# Patient Record
Sex: FEMALE | Race: ASIAN | Employment: FULL TIME | URBAN - METROPOLITAN AREA
[De-identification: names, ages, dates, MRNs, and addresses within clinical notes are randomized per-mention and may not be internally consistent; named-entity substitution may affect disease eponyms.]

---

## 2018-01-24 LAB
HBV SURFACE AG SERPL QL IA: NEGATIVE
HIV 1+2 AB+HIV1 P24 AG SERPL QL IA: NORMAL
RUBELLA ANTIBODY IGG QUANTITATIVE: NORMAL IU/ML
T PALLIDUM IGG SER QL: NORMAL

## 2018-03-19 ENCOUNTER — PRE VISIT (OUTPATIENT)
Dept: MATERNAL FETAL MEDICINE | Facility: CLINIC | Age: 38
End: 2018-03-19

## 2018-03-26 ENCOUNTER — OFFICE VISIT (OUTPATIENT)
Dept: MATERNAL FETAL MEDICINE | Facility: CLINIC | Age: 38
End: 2018-03-26
Attending: OBSTETRICS & GYNECOLOGY
Payer: COMMERCIAL

## 2018-03-26 ENCOUNTER — HOSPITAL ENCOUNTER (OUTPATIENT)
Dept: ULTRASOUND IMAGING | Facility: CLINIC | Age: 38
Discharge: HOME | End: 2018-03-26
Attending: OBSTETRICS & GYNECOLOGY | Admitting: OBSTETRICS & GYNECOLOGY
Payer: COMMERCIAL

## 2018-03-26 DIAGNOSIS — O09.522 ELDERLY MULTIGRAVIDA IN SECOND TRIMESTER: Primary | ICD-10-CM

## 2018-03-26 DIAGNOSIS — O26.90 PREGNANCY RELATED CONDITION, ANTEPARTUM: ICD-10-CM

## 2018-03-26 DIAGNOSIS — O09.522 SUPERVISION OF ELDERLY MULTIGRAVIDA IN SECOND TRIMESTER: Primary | ICD-10-CM

## 2018-03-26 DIAGNOSIS — O26.879 SHORT CERVIX AFFECTING PREGNANCY: ICD-10-CM

## 2018-03-26 DIAGNOSIS — O34.219 HISTORY OF CESAREAN DELIVERY AFFECTING PREGNANCY: ICD-10-CM

## 2018-03-26 DIAGNOSIS — O44.02 PLACENTA PREVIA ANTEPARTUM IN SECOND TRIMESTER: ICD-10-CM

## 2018-03-26 PROCEDURE — 96040 ZZH GENETIC COUNSELING, EACH 30 MINUTES: CPT | Mod: ZF | Performed by: GENETIC COUNSELOR, MS

## 2018-03-26 PROCEDURE — 76811 OB US DETAILED SNGL FETUS: CPT

## 2018-03-26 PROCEDURE — 76817 TRANSVAGINAL US OBSTETRIC: CPT

## 2018-03-26 NOTE — MR AVS SNAPSHOT
After Visit Summary   3/26/2018    Erin Oro    MRN: [id number]           Patient Information     Date Of Birth          1980        Visit Information        Provider Department      3/26/2018 11:30 AM Hallie Lazcano MD [facility] Maternal Fetal Medicine [facility]        Today's Diagnoses     Elderly multigravida in second trimester    -  1    History of  delivery affecting pregnancy        Short cervix affecting pregnancy        Placenta previa antepartum in second trimester           Follow-ups after your visit        Your next 10 appointments already scheduled     2018  3:00 PM CDT   (Arrive by 2:45 PM)   MFM US OB TRANSVAGINAL with SHMFMUSR3   [facility] Maternal Fetal Medicine Ultrasound - [facility] ([facility])    [address] [place] [address]  [facility] [id number] [phone]           Wear comfortable clothes and leave your valuables at home.            2018  3:30 PM CDT   [facility]   [facility] Maternal Fetal Medicine - [facility] ([facility])    [address] [place] [address]  [facility] [id number] [phone]           Please arrive at the time given for your first appointment. This visit is used internally to schedule the physician's time during your ultrasound.              Future tests that were ordered for you today     Open Future Orders        Priority Expected Expires Ordered    MFM US OB Transvaginal Routine 2018 2019 3/26/2018            Who to contact     If you have questions or need follow up information about today's clinic visit or your schedule please contact [facility] MATERNAL FETAL MEDICINE [facility] directly at [phone].  Normal or non-critical lab and imaging results will be communicated to you by MyChart, letter or phone within 4 business days after the clinic has received the results. If you do not hear from us within 7 days, please contact the clinic through  "MobSmithhart or phone. If you have a critical or abnormal lab result, we will notify you by phone as soon as possible.  Submit refill requests through Quipper or call your pharmacy and they will forward the refill request to us. Please allow 3 business days for your refill to be completed.          Additional Information About Your Visit        MobSmithhart Information     Quipper lets you send messages to your doctor, view your test results, renew your prescriptions, schedule appointments and more. To sign up, go to [email or web address] . Click on \"Log in\" on the left side of the screen, which will take you to the Welcome page. Then click on \"Sign up Now\" on the right side of the page.     You will be asked to enter the access code listed below, as well as some personal information. Please follow the directions to create your username and password.     Your access code is: [email or web address]  Expires: 2018 12:10 PM     Your access code will  in 90 days. If you need help or a new code, please call your [place] clinic or [phone].        Care EveryWhere ID     This is your Care EveryWhere ID. This could be used by other organizations to access your [place] medical records  [id number]        Your Vitals Were     Last Period                   2017            Blood Pressure from Last 3 Encounters:   No data found for BP    Weight from Last 3 Encounters:   No data found for Wt               Primary Care Provider    None Specified       No primary provider on file.        Equal Access to Services     [facility] : Hadii timur mcnultyo Sobenjamin, waaxda luqadaha, qaybta kaalmada renukada, jose james . So [facility] [phone].    ATENCIÓN: Si habla español, tiene a vaughn disposición servicios gratuitos de asistencia lingüística. Llame al [phone].    We comply with applicable federal civil rights laws and [place] laws. We do not discriminate on the basis of race, color, national " origin, age, disability, sex, sexual orientation, or gender identity.            Thank you!     Thank you for choosing [facility]  for your care. Our goal is always to provide you with excellent care. Hearing back from our patients is one way we can continue to improve our services. Please take a few minutes to complete the written survey that you may receive in the mail after your visit with us. Thank you!             Your Updated Medication List - Protect others around you: Learn how to safely use, store and throw away your medicines at www.disposemymeds.org.      Notice  As of 3/26/2018 12:10 PM    You have not been prescribed any medications.

## 2018-03-26 NOTE — PROGRESS NOTES
Please see ultrasound report under imaging tab for details on ultrasound performed today.    Hallie Lazcano MD  , OB/GYN  Maternal-Fetal Medicine  [email or web address]  [phone] (Academic office)  [phone] (Pager)

## 2018-03-26 NOTE — MR AVS SNAPSHOT
After Visit Summary   3/26/2018    Erin Oro    MRN: [id number]           Patient Information     Date Of Birth          1980        Visit Information        Provider Department      3/26/2018 10:15 AM Berna Castillo GC [facility] Maternal Fetal Medicine [facility]        Today's Diagnoses     Supervision of elderly multigravida in second trimester    -  1    Pregnancy related condition, antepartum           Follow-ups after your visit        Your next 10 appointments already scheduled     Apr 02, 2018  3:00 PM CDT   (Arrive by 2:45 PM)   MFM US OB TRANSVAGINAL with SHMFMUSR3   [facility] Maternal Fetal Medicine Ultrasound - [facility] ([facility])    [address] [place] [address]  [place] [id number] [phone]           Wear comfortable clothes and leave your valuables at home.            Apr 02, 2018  3:30 PM CDT   [facility]   [facility] Maternal Fetal Medicine [facility] ([facility])    [address] [place] [address]  [facility] [id number] [phone]           Please arrive at the time given for your first appointment. This visit is used internally to schedule the physician's time during your ultrasound.              Future tests that were ordered for you today     Open Future Orders        Priority Expected Expires Ordered    MFM US OB Transvaginal Routine 4/2/2018 1/26/2019 3/26/2018            Who to contact     If you have questions or need follow up information about today's clinic visit or your schedule please contact [facility] MATERNAL FETAL [facility] directly at [phone].  Normal or non-critical lab and imaging results will be communicated to you by MyChart, letter or phone within 4 business days after the clinic has received the results. If you do not hear from us within 7 days, please contact the clinic through MyChart or phone. If you have a critical or abnormal lab result, we will notify you by phone  "as soon as possible.  Submit refill requests through Holidog or call your pharmacy and they will forward the refill request to us. Please allow 3 business days for your refill to be completed.          Additional Information About Your Visit        Check I'm HereharChat Sports Information     Holidog lets you send messages to your doctor, view your test results, renew your prescriptions, schedule appointments and more. To sign up, go to [email or web address] . Click on \"Log in\" on the left side of the screen, which will take you to the Welcome page. Then click on \"Sign up Now\" on the right side of the page.     You will be asked to enter the access code listed below, as well as some personal information. Please follow the directions to create your username and password.     Your access code is: [email or web address]  Expires: 2018 12:10 PM     Your access code will  in 90 days. If you need help or a new code, please call your [place] clinic or [phone].        Care EveryWhere ID     This is your Care EveryWhere ID. This could be used by other organizations to access your [place] medical records  [id number]        Your Vitals Were     Last Period                   2017            Blood Pressure from Last 3 Encounters:   No data found for BP    Weight from Last 3 Encounters:   No data found for Wt              We Performed the Following     [facility]        Primary Care Provider    None Specified       No primary provider on file.        Equal Access to Services     BRITNEY GARCÍA : Hadii timur Mendoza, waaxda neliaadaha, qaybta kaaljose darnell . So [facility] [phone].    ATENCIÓN: Si habla español, tiene a vaughn disposición servicios gratuitos de asistencia lingüística. Jeanne al [phone].    We comply with applicable federal civil rights laws and [place] laws. We do not discriminate on the basis of race, color, national origin, age, disability, sex, " sexual orientation, or gender identity.            Thank you!     Thank you for choosing [facility]  for your care. Our goal is always to provide you with excellent care. Hearing back from our patients is one way we can continue to improve our services. Please take a few minutes to complete the written survey that you may receive in the mail after your visit with us. Thank you!             Your Updated Medication List - Protect others around you: Learn how to safely use, store and throw away your medicines at www.disposemymeds.org.      Notice  As of 3/26/2018  2:34 PM    You have not been prescribed any medications.

## 2018-03-26 NOTE — PROGRESS NOTES
[facility]  Genetic Counseling Consult    Patient: Erin Oro YOB: 1980   Date of Service: 3/26/18      Erin Oro was seen at [facility] for genetic consultation as part of her comprehensive ultrasound appointment to discuss the options for screening and testing for fetal chromosome abnormalities.  The indication for genetic counseling is advanced maternal age.        Impression/Plan:   1.  Erin had a level II ultrasound. A shorter than expected cervix was noted on ultrasound today. See ultrasound report.    2.  Erin had a normal InformaSeq screen through her OB provider, consistent with a male fetus.    3.  Erin has had a normal AFP screen (1.16 MoM).    Pregnancy History:   /Parity:    Age at Delivery: 37 year old  LESLI: 2018, by Last Menstrual Period  Gestational Age: 18w6d    No significant complications or exposures were reported in the current pregnancy. She had approximately 2 weeks of bleeding in the first trimester which has since resolved. Erin reports having ultrasounds in that time period which were reassuring.    Erin corea pregnancy history is significant for:  o 2013:  (breech), female  o : repeat , female    Medical History:   Erin corea reported medical history is not expected to impact pregnancy management or risks to fetal development.       Family History:   A three-generation pedigree was obtained, and is scanned under the  Media  tab.   The following significant findings were reported by Erin:    Erin's partner, Se Torres's mother is in good health. She reportedly had several brothers and sisters, all of whom passed away in early childhood. No further information is known regarding the causes of death for these individuals.    Otherwise, the reported family history is negative for multiple miscarriages, stillbirths, birth defects, mental retardation,  known genetic conditions, and consanguinity.       Risk Assessment for Chromosome Conditions:   We explained that the risk for fetal chromosome abnormalities increases with maternal age. We discussed specific features of common chromosome abnormalities, including Down syndrome, trisomy 13, trisomy 18, and sex chromosome trisomies. These risk estimates are no longer accurate given her normal NIPT result.      - At age 37 at midtrimester, the risk to have a baby with Down syndrome is 1 in 129.     - At age 37 at midtrimester, the risk to have a baby with any chromosome abnormality is 1 in 65.     Testing Options:   We discussed the following options:   Comprehensive (Level II) ultrasound: Detailed ultrasound performed between 18-22 weeks gestation to screen for major birth defects and markers for aneuploidy.    We reviewed the benefits and limitations of this testing.  Screening tests provide a risk assessment specific to the pregnancy for certain fetal chromosome abnormalities, but cannot definitively diagnose or exclude a fetal chromosome abnormality.  Follow-up genetic counseling and consideration of diagnostic testing is recommended with any abnormal screening result. Diagnostic tests carry inherent risks- including risk of miscarriage- that require careful consideration.  These tests can detect fetal chromosome abnormalities with greater than 99% certainty.  Results can be compromised by maternal cell contamination or mosaicism, and are limited by the resolution of cytogenetic G-banding technology.  There is no screening nor diagnostic test that can detect all forms of birth defects or mental disability.     It was a pleasure to be involved with Erin s care. Face-to-face time of the meeting was 20 minutes.    Jenny Castillo MS, [facility]  Maternal Fetal Medicine  [facility]  Ph: [phone]  [email or web address]

## 2018-04-02 ENCOUNTER — OFFICE VISIT (OUTPATIENT)
Dept: MATERNAL FETAL MEDICINE | Facility: CLINIC | Age: 38
End: 2018-04-02
Attending: OBSTETRICS & GYNECOLOGY
Payer: COMMERCIAL

## 2018-04-02 ENCOUNTER — HOSPITAL ENCOUNTER (OUTPATIENT)
Dept: ULTRASOUND IMAGING | Facility: CLINIC | Age: 38
Discharge: HOME | End: 2018-04-02
Attending: OBSTETRICS & GYNECOLOGY | Admitting: OBSTETRICS & GYNECOLOGY
Payer: COMMERCIAL

## 2018-04-02 DIAGNOSIS — O26.879 SHORT CERVIX AFFECTING PREGNANCY: Primary | ICD-10-CM

## 2018-04-02 DIAGNOSIS — O44.02 PLACENTA PREVIA ANTEPARTUM IN SECOND TRIMESTER: ICD-10-CM

## 2018-04-02 DIAGNOSIS — O26.879 SHORT CERVIX AFFECTING PREGNANCY: ICD-10-CM

## 2018-04-02 PROCEDURE — 76817 TRANSVAGINAL US OBSTETRIC: CPT

## 2018-04-02 NOTE — MR AVS SNAPSHOT
After Visit Summary   4/2/2018    Erin Oro    MRN: [id number]           Patient Information     Date Of Birth          1980        Visit Information        Provider Department      4/2/2018 3:30 PM Hallie Lazcano MD [facility] Maternal Fetal Medicine [facility]        Today's Diagnoses     Short cervix affecting pregnancy    -  1    Placenta previa antepartum in second trimester           Follow-ups after your visit        Your next 10 appointments already scheduled     Apr 16, 2018  3:00 PM CDT   (Arrive by 2:45 PM)   MFM US OB TRANSVAGINAL with SHMFMUSR3   [facility] Maternal Fetal Medicine Ultrasound - [facility] ([facility])    [address] [place] [address] 250  [place] [address] [phone]           Wear comfortable clothes and leave your valuables at home.            Apr 16, 2018  3:30 PM CDT   Radiology MD with  DELMA CONNOLLY   [facility] Maternal Fetal Medicine [facility] ([facility])    [address] [place] [address] 250  [place] [address] [phone]           Please arrive at the time given for your first appointment. This visit is used internally to schedule the physician's time during your ultrasound.              Future tests that were ordered for you today     Open Future Orders        Priority Expected Expires Ordered    MFM US OB Transvaginal Routine 4/16/2018 2/2/2019 4/2/2018            Who to contact     If you have questions or need follow up information about today's clinic visit or your schedule please contact [facility] MATERNAL FETAL MEDIC[facility] directly at [phone].  Normal or non-critical lab and imaging results will be communicated to you by MyChart, letter or phone within 4 business days after the clinic has received the results. If you do not hear from us within 7 days, please contact the clinic through MyChart or phone. If you have a critical or abnormal lab result, we will notify you by phone as soon as  "possible.  Submit refill requests through Journeys or call your pharmacy and they will forward the refill request to us. Please allow 3 business days for your refill to be completed.          Additional Information About Your Visit        RenmatixharCrowdcast Information     Journeys lets you send messages to your doctor, view your test results, renew your prescriptions, schedule appointments and more. To sign up, go to [email or web address] . Click on \"Log in\" on the left side of the screen, which will take you to the Welcome page. Then click on \"Sign up Now\" on the right side of the page.     You will be asked to enter the access code listed below, as well as some personal information. Please follow the directions to create your username and password.     Your access code is: [email or web address]  Expires: 2018 12:10 PM     Your access code will  in 90 days. If you need help or a new code, please call your [place] clinic or [phone].        Care EveryWhere ID     This is your Care EveryWhere ID. This could be used by other organizations to access your [place] medical records  [id number]        Your Vitals Were     Last Period                   2017            Blood Pressure from Last 3 Encounters:   No data found for BP    Weight from Last 3 Encounters:   No data found for Wt               Primary Care Provider    None Specified       No primary provider on file.        Equal Access to Services     [facility]: Hadii timur mcnultyo Sotirsoali, waaxda luqadaha, qaybta kaalmada veto, jose james . So [facility] [phone].    ATENCIÓN: Si habla español, tiene a vaughn disposición servicios gratuitos de asistencia lingüística. Llame al [phone].    We comply with applicable federal civil rights laws and [place] laws. We do not discriminate on the basis of race, color, national origin, age, disability, sex, sexual orientation, or gender identity.            Thank you!     Thank you " for choosing [facility]  for your care. Our goal is always to provide you with excellent care. Hearing back from our patients is one way we can continue to improve our services. Please take a few minutes to complete the written survey that you may receive in the mail after your visit with us. Thank you!             Your Updated Medication List - Protect others around you: Learn how to safely use, store and throw away your medicines at www.disposemymeds.org.      Notice  As of 4/2/2018  4:14 PM    You have not been prescribed any medications.

## 2018-04-02 NOTE — PROGRESS NOTES
Please see ultrasound report under imaging tab for details on ultrasound performed today.    Hallie Lazcano MD  , OB/GYN  Maternal-Fetal Medicine  [email or web address]  [phone] (Academic office)  [phone] (Pager)

## 2018-04-16 ENCOUNTER — HOSPITAL ENCOUNTER (OUTPATIENT)
Dept: ULTRASOUND IMAGING | Facility: CLINIC | Age: 38
Discharge: HOME | End: 2018-04-16
Attending: OBSTETRICS & GYNECOLOGY | Admitting: OBSTETRICS & GYNECOLOGY
Payer: COMMERCIAL

## 2018-04-16 ENCOUNTER — OFFICE VISIT (OUTPATIENT)
Dept: MATERNAL FETAL MEDICINE | Facility: CLINIC | Age: 38
End: 2018-04-16
Attending: OBSTETRICS & GYNECOLOGY
Payer: COMMERCIAL

## 2018-04-16 DIAGNOSIS — O26.879 SHORT CERVIX AFFECTING PREGNANCY: ICD-10-CM

## 2018-04-16 DIAGNOSIS — O26.879 SHORT CERVIX AFFECTING PREGNANCY: Primary | ICD-10-CM

## 2018-04-16 PROCEDURE — 76817 TRANSVAGINAL US OBSTETRIC: CPT

## 2018-04-16 NOTE — PROGRESS NOTES
"Please see \"Imaging\" tab under \"Chart Review\" for details of today's US.    Justine Butt, DO    "

## 2018-04-16 NOTE — MR AVS SNAPSHOT
After Visit Summary   4/16/2018    Erin Oro    MRN: [id number]           Patient Information     Date Of Birth          1980        Visit Information        Provider Department      4/16/2018 3:30 PM Justine Butt,  [facility]        Today's Diagnoses     Short cervix affecting pregnancy    -  1       Follow-ups after your visit        Your next 10 appointments already scheduled     Apr 24, 2018  3:00 PM CDT   (Arrive by 2:45 PM)   MFM US OB TRANSVAGINAL with MFMUSR3   [facility] Maternal Fetal Medicine Ultrasound - [facility])    [address]  [facility] [address] [phone]           Wear comfortable clothes and leave your valuables at home.            Apr 24, 2018  3:30 PM CDT   Radiology MD with  MFM MD   [facility] Maternal Fetal Medicine [facility] ([facility])    [address]  [facility] [address] [phone]           Please arrive at the time given for your first appointment. This visit is used internally to schedule the physician's time during your ultrasound.            Aug 14, 2018   Procedure with Yeny Corey MD    [facility])    [address]  [facility] [address] [phone]              Future tests that were ordered for you today     Open Future Orders        Priority Expected Expires Ordered    MFM US OB Transvaginal Routine 4/23/2018 2/16/2019 4/16/2018            Who to contact     If you have questions or need follow up information about today's clinic visit or your schedule please contact [facility] MATERNAL FETAL [facility] directly at [phone].  Normal or non-critical lab and imaging results will be communicated to you by MyChart, letter or phone within 4 business days after the clinic has received the results. If you do not hear from us within 7 days, please contact the clinic through MyChart or phone. If you  "have a critical or abnormal lab result, we will notify you by phone as soon as possible.  Submit refill requests through Retidoc or call your pharmacy and they will forward the refill request to us. Please allow 3 business days for your refill to be completed.          Additional Information About Your Visit        shopphart Information     Retidoc lets you send messages to your doctor, view your test results, renew your prescriptions, schedule appointments and more. To sign up, go to [email or web address] . Click on \"Log in\" on the left side of the screen, which will take you to the Welcome page. Then click on \"Sign up Now\" on the right side of the page.     You will be asked to enter the access code listed below, as well as some personal information. Please follow the directions to create your username and password.     Your access code is: [email or web address]  Expires: 2018 12:10 PM     Your access code will  in 90 days. If you need help or a new code, please call your [place] clinic or [phone].        Care EveryWhere ID     This is your Care EveryWhere ID. This could be used by other organizations to access your [place] medical records  [id number]        Your Vitals Were     Last Period                   2017            Blood Pressure from Last 3 Encounters:   No data found for BP    Weight from Last 3 Encounters:   No data found for Wt               Primary Care Provider    None Specified       No primary provider on file.        Equal Access to Services     [facility]: Hadii timur mcnultyo Sobenjamin, waaxda luqadaha, qaybta kaalmada jose laws . So [facility] [phone].    ATENCIÓN: Si habla español, tiene a vaughn disposición servicios gratuitos de asistencia lingüística. Llame al [phone].    We comply with applicable federal civil rights laws and [place] laws. We do not discriminate on the basis of race, color, national origin, age, disability, " sex, sexual orientation, or gender identity.            Thank you!     Thank you for choosing [facility]  for your care. Our goal is always to provide you with excellent care. Hearing back from our patients is one way we can continue to improve our services. Please take a few minutes to complete the written survey that you may receive in the mail after your visit with us. Thank you!             Your Updated Medication List - Protect others around you: Learn how to safely use, store and throw away your medicines at www.disposemymeds.org.      Notice  As of 4/16/2018  4:59 PM    You have not been prescribed any medications.

## 2018-04-24 ENCOUNTER — HOSPITAL ENCOUNTER (OUTPATIENT)
Dept: ULTRASOUND IMAGING | Facility: CLINIC | Age: 38
Discharge: HOME | End: 2018-04-24
Attending: OBSTETRICS & GYNECOLOGY | Admitting: OBSTETRICS & GYNECOLOGY
Payer: COMMERCIAL

## 2018-04-24 ENCOUNTER — OFFICE VISIT (OUTPATIENT)
Dept: MATERNAL FETAL MEDICINE | Facility: CLINIC | Age: 38
End: 2018-04-24
Attending: OBSTETRICS & GYNECOLOGY
Payer: COMMERCIAL

## 2018-04-24 DIAGNOSIS — O26.879 SHORT CERVIX AFFECTING PREGNANCY: ICD-10-CM

## 2018-04-24 DIAGNOSIS — O26.872 SHORT CERVIX DURING PREGNANCY IN SECOND TRIMESTER: Primary | ICD-10-CM

## 2018-04-24 PROCEDURE — 76817 TRANSVAGINAL US OBSTETRIC: CPT

## 2018-04-24 NOTE — MR AVS SNAPSHOT
After Visit Summary   4/24/2018    Erin Oro    MRN: [id number]           Patient Information     Date Of Birth          1980        Visit Information        Provider Department      4/24/2018 3:30 PM Guanako Carreon MD [facility]        Today's Diagnoses     Short cervix during pregnancy in second trimester    -  1       Follow-ups after your visit        Your next 10 appointments already scheduled     Apr 24, 2018  3:30 PM CDT   Radiology MD with Guanako Carreon MD   [facility] ([facility] [place])    [address]  [facility] [address] [phone]           Please arrive at the time given for your first appointment. This visit is used internally to schedule the physician's time during your ultrasound.            Aug 14, 2018   Procedure with Yeny Corey MD    Birthplace (--)    [address], [address]  [facility] [address] [phone]              Who to contact     If you have questions or need follow up information about today's clinic visit or your schedule please contact [facility] directly at [phone].  Normal or non-critical lab and imaging results will be communicated to you by MyChart, letter or phone within 4 business days after the clinic has received the results. If you do not hear from us within 7 days, please contact the clinic through MyChart or phone. If you have a critical or abnormal lab result, we will notify you by phone as soon as possible.  Submit refill requests through [employer] or call your pharmacy and they will forward the refill request to us. Please allow 3 business days for your refill to be completed.          Additional Information About Your Visit        [employer] Information     [employer] lets you send messages to your doctor, view your test results, renew your prescriptions, schedule appointments and more. To sign up, go to  "[email or web address] . Click on \"Log in\" on the left side of the screen, which will take you to the Welcome page. Then click on \"Sign up Now\" on the right side of the page.     You will be asked to enter the access code listed below, as well as some personal information. Please follow the directions to create your username and password.     Your access code is: [email or web address]  Expires: 2018 12:10 PM     Your access code will  in 90 days. If you need help or a new code, please call your [facility] clinic or [phone].        Care EveryWhere ID     This is your Care EveryWhere ID. This could be used by other organizations to access your [facility] medical records  [id number]        Your Vitals Were     Last Period                   2017            Blood Pressure from Last 3 Encounters:   No data found for BP    Weight from Last 3 Encounters:   No data found for Wt              Today, you had the following     No orders found for display         Today's Medication Changes          These changes are accurate as of 18  3:13 PM.  If you have any questions, ask your nurse or doctor.               Start taking these medicines.        Dose/Directions    progesterone 200 MG capsule   Commonly known as:  PROMETRIUM   Used for:  Short cervix during pregnancy in second trimester        Dose:  200 mg   Take 1 capsule (200 mg) by mouth daily   Quantity:  28 capsule   Refills:  3            Where to get your medicines      Some of these will need a paper prescription and others can be bought over the counter.  Ask your nurse if you have questions.     Bring a paper prescription for each of these medications     progesterone 200 MG capsule                Primary Care Provider    None Specified       No primary provider on file.        Equal Access to Services     BRITNEY GARCÍA : mook Collier, jose clancy. So [facility] " [phone].    ATENCIÓN: Si kelsila jules, tiene a vaughn disposición servicios gratuitos de asistencia lingüística. Jeanne al [phone].    We comply with applicable federal civil rights laws and [place] laws. We do not discriminate on the basis of race, color, national origin, age, disability, sex, sexual orientation, or gender identity.            Thank you!     Thank you for choosing [facility]  for your care. Our goal is always to provide you with excellent care. Hearing back from our patients is one way we can continue to improve our services. Please take a few minutes to complete the written survey that you may receive in the mail after your visit with us. Thank you!             Your Updated Medication List - Protect others around you: Learn how to safely use, store and throw away your medicines at www.disposemymeds.org.          This list is accurate as of 4/24/18  3:13 PM.  Always use your most recent med list.                   Brand Name Dispense Instructions for use Diagnosis    progesterone 200 MG capsule    PROMETRIUM    28 capsule    Take 1 capsule (200 mg) by mouth daily    Short cervix during pregnancy in second trimester

## 2018-04-24 NOTE — PROGRESS NOTES
Please refer to ultrasound report under 'Imaging' Studies of 'Chart Review' tabs.    Guanako Carreon M.D.

## 2018-07-27 LAB — GROUP B STREP PCR: NEGATIVE

## 2018-08-14 ENCOUNTER — ANESTHESIA (OUTPATIENT)
Dept: OBGYN | Facility: CLINIC | Age: 38
End: 2018-08-14
Payer: COMMERCIAL

## 2018-08-14 ENCOUNTER — HOSPITAL ENCOUNTER (INPATIENT)
Facility: CLINIC | Age: 38
LOS: 3 days | Discharge: HOME | End: 2018-08-17
Attending: OBSTETRICS & GYNECOLOGY | Admitting: OBSTETRICS & GYNECOLOGY
Payer: COMMERCIAL

## 2018-08-14 ENCOUNTER — ANESTHESIA EVENT (OUTPATIENT)
Dept: OBGYN | Facility: CLINIC | Age: 38
End: 2018-08-14
Payer: COMMERCIAL

## 2018-08-14 DIAGNOSIS — Z98.891 S/P REPEAT LOW TRANSVERSE C-SECTION: Primary | ICD-10-CM

## 2018-08-14 LAB
ABO + RH BLD: NORMAL
ABO + RH BLD: NORMAL
APTT PPP: 26 SEC (ref 22–37)
BLD GP AB SCN SERPL QL: NORMAL
BLOOD BANK CMNT PATIENT-IMP: NORMAL
ERYTHROCYTE [DISTWIDTH] IN BLOOD BY AUTOMATED COUNT: 13 % (ref 10–15)
HCT VFR BLD AUTO: 35 % (ref 35–47)
HGB BLD-MCNC: 11.2 G/DL (ref 11.7–15.7)
HGB BLD-MCNC: 11.7 G/DL (ref 11.7–15.7)
INR PPP: 0.93 (ref 0.86–1.14)
MCH RBC QN AUTO: 32.2 PG (ref 26.5–33)
MCHC RBC AUTO-ENTMCNC: 33.4 G/DL (ref 31.5–36.5)
MCV RBC AUTO: 96 FL (ref 78–100)
PLATELET # BLD AUTO: 153 10E9/L (ref 150–450)
RBC # BLD AUTO: 3.63 10E12/L (ref 3.8–5.2)
SPECIMEN EXP DATE BLD: NORMAL
T PALLIDUM AB SER QL: NONREACTIVE
WBC # BLD AUTO: 13.5 10E9/L (ref 4–11)

## 2018-08-14 PROCEDURE — 85018 HEMOGLOBIN: CPT | Performed by: OBSTETRICS & GYNECOLOGY

## 2018-08-14 PROCEDURE — 85610 PROTHROMBIN TIME: CPT | Performed by: OBSTETRICS & GYNECOLOGY

## 2018-08-14 PROCEDURE — 36000058 ZZH SURGERY LEVEL 3 EA 15 ADDTL MIN: Performed by: OBSTETRICS & GYNECOLOGY

## 2018-08-14 PROCEDURE — 86850 RBC ANTIBODY SCREEN: CPT | Performed by: OBSTETRICS & GYNECOLOGY

## 2018-08-14 PROCEDURE — 37000009 ZZH ANESTHESIA TECHNICAL FEE, EACH ADDTL 15 MIN: Performed by: OBSTETRICS & GYNECOLOGY

## 2018-08-14 PROCEDURE — 25000132 ZZH RX MED GY IP 250 OP 250 PS 637: Performed by: OBSTETRICS & GYNECOLOGY

## 2018-08-14 PROCEDURE — 25000128 H RX IP 250 OP 636: Performed by: OBSTETRICS & GYNECOLOGY

## 2018-08-14 PROCEDURE — 36000056 ZZH SURGERY LEVEL 3 1ST 30 MIN: Performed by: OBSTETRICS & GYNECOLOGY

## 2018-08-14 PROCEDURE — 86901 BLOOD TYPING SEROLOGIC RH(D): CPT | Performed by: OBSTETRICS & GYNECOLOGY

## 2018-08-14 PROCEDURE — 27210794 ZZH OR GENERAL SUPPLY STERILE: Performed by: OBSTETRICS & GYNECOLOGY

## 2018-08-14 PROCEDURE — 37000008 ZZH ANESTHESIA TECHNICAL FEE, 1ST 30 MIN: Performed by: OBSTETRICS & GYNECOLOGY

## 2018-08-14 PROCEDURE — 25000125 ZZHC RX 250: Performed by: NURSE ANESTHETIST, CERTIFIED REGISTERED

## 2018-08-14 PROCEDURE — 36415 COLL VENOUS BLD VENIPUNCTURE: CPT | Performed by: OBSTETRICS & GYNECOLOGY

## 2018-08-14 PROCEDURE — 0HB7XZZ EXCISION OF ABDOMEN SKIN, EXTERNAL APPROACH: ICD-10-PCS | Performed by: OBSTETRICS & GYNECOLOGY

## 2018-08-14 PROCEDURE — 71000012 ZZH RECOVERY PHASE 1 LEVEL 1 FIRST HR: Performed by: OBSTETRICS & GYNECOLOGY

## 2018-08-14 PROCEDURE — 25000128 H RX IP 250 OP 636

## 2018-08-14 PROCEDURE — 86900 BLOOD TYPING SEROLOGIC ABO: CPT | Performed by: OBSTETRICS & GYNECOLOGY

## 2018-08-14 PROCEDURE — 71000013 ZZH RECOVERY PHASE 1 LEVEL 1 EA ADDTL HR: Performed by: OBSTETRICS & GYNECOLOGY

## 2018-08-14 PROCEDURE — 12000037 ZZH R&B POSTPARTUM INTERMEDIATE

## 2018-08-14 PROCEDURE — 85027 COMPLETE CBC AUTOMATED: CPT | Performed by: OBSTETRICS & GYNECOLOGY

## 2018-08-14 PROCEDURE — 85730 THROMBOPLASTIN TIME PARTIAL: CPT | Performed by: OBSTETRICS & GYNECOLOGY

## 2018-08-14 PROCEDURE — 86780 TREPONEMA PALLIDUM: CPT | Performed by: OBSTETRICS & GYNECOLOGY

## 2018-08-14 PROCEDURE — 25000128 H RX IP 250 OP 636: Performed by: NURSE ANESTHETIST, CERTIFIED REGISTERED

## 2018-08-14 RX ORDER — OXYTOCIN/0.9 % SODIUM CHLORIDE 30/500 ML
PLASTIC BAG, INJECTION (ML) INTRAVENOUS
Status: DISCONTINUED
Start: 2018-08-14 | End: 2018-08-14 | Stop reason: HOSPADM

## 2018-08-14 RX ORDER — ONDANSETRON 2 MG/ML
INJECTION INTRAMUSCULAR; INTRAVENOUS PRN
Status: DISCONTINUED | OUTPATIENT
Start: 2018-08-14 | End: 2018-08-14

## 2018-08-14 RX ORDER — BUPIVACAINE HYDROCHLORIDE 7.5 MG/ML
INJECTION, SOLUTION INTRASPINAL PRN
Status: DISCONTINUED | OUTPATIENT
Start: 2018-08-14 | End: 2018-08-14

## 2018-08-14 RX ORDER — OXYTOCIN/0.9 % SODIUM CHLORIDE 30/500 ML
340 PLASTIC BAG, INJECTION (ML) INTRAVENOUS CONTINUOUS PRN
Status: DISCONTINUED | OUTPATIENT
Start: 2018-08-14 | End: 2018-08-17 | Stop reason: HOSPADM

## 2018-08-14 RX ORDER — MISOPROSTOL 200 UG/1
400 TABLET ORAL
Status: DISCONTINUED | OUTPATIENT
Start: 2018-08-14 | End: 2018-08-17 | Stop reason: HOSPADM

## 2018-08-14 RX ORDER — OXYTOCIN/0.9 % SODIUM CHLORIDE 30/500 ML
PLASTIC BAG, INJECTION (ML) INTRAVENOUS
Status: COMPLETED
Start: 2018-08-14 | End: 2018-08-14

## 2018-08-14 RX ORDER — KETOROLAC TROMETHAMINE 30 MG/ML
30 INJECTION, SOLUTION INTRAMUSCULAR; INTRAVENOUS EVERY 6 HOURS
Status: COMPLETED | OUTPATIENT
Start: 2018-08-14 | End: 2018-08-15

## 2018-08-14 RX ORDER — ONDANSETRON 2 MG/ML
4 INJECTION INTRAMUSCULAR; INTRAVENOUS EVERY 6 HOURS PRN
Status: DISCONTINUED | OUTPATIENT
Start: 2018-08-14 | End: 2018-08-17 | Stop reason: HOSPADM

## 2018-08-14 RX ORDER — AMOXICILLIN 250 MG
1 CAPSULE ORAL 2 TIMES DAILY PRN
Status: DISCONTINUED | OUTPATIENT
Start: 2018-08-14 | End: 2018-08-17 | Stop reason: HOSPADM

## 2018-08-14 RX ORDER — SIMETHICONE 80 MG
80 TABLET,CHEWABLE ORAL 4 TIMES DAILY PRN
Status: DISCONTINUED | OUTPATIENT
Start: 2018-08-14 | End: 2018-08-17 | Stop reason: HOSPADM

## 2018-08-14 RX ORDER — METHYLERGONOVINE MALEATE 0.2 MG/ML
200 INJECTION INTRAVENOUS
Status: COMPLETED | OUTPATIENT
Start: 2018-08-14 | End: 2018-08-14

## 2018-08-14 RX ORDER — SODIUM CHLORIDE, SODIUM LACTATE, POTASSIUM CHLORIDE, CALCIUM CHLORIDE 600; 310; 30; 20 MG/100ML; MG/100ML; MG/100ML; MG/100ML
INJECTION, SOLUTION INTRAVENOUS CONTINUOUS
Status: DISCONTINUED | OUTPATIENT
Start: 2018-08-14 | End: 2018-08-14

## 2018-08-14 RX ORDER — OXYTOCIN/0.9 % SODIUM CHLORIDE 30/500 ML
PLASTIC BAG, INJECTION (ML) INTRAVENOUS CONTINUOUS PRN
Status: DISCONTINUED | OUTPATIENT
Start: 2018-08-14 | End: 2018-08-14

## 2018-08-14 RX ORDER — DIPHENHYDRAMINE HCL 25 MG
25 CAPSULE ORAL EVERY 6 HOURS PRN
Status: DISCONTINUED | OUTPATIENT
Start: 2018-08-14 | End: 2018-08-17 | Stop reason: HOSPADM

## 2018-08-14 RX ORDER — IBUPROFEN 400 MG/1
800 TABLET, FILM COATED ORAL EVERY 6 HOURS PRN
Status: DISCONTINUED | OUTPATIENT
Start: 2018-08-15 | End: 2018-08-17 | Stop reason: HOSPADM

## 2018-08-14 RX ORDER — CITRIC ACID/SODIUM CITRATE 334-500MG
30 SOLUTION, ORAL ORAL
Status: COMPLETED | OUTPATIENT
Start: 2018-08-14 | End: 2018-08-14

## 2018-08-14 RX ORDER — BUPIVACAINE HYDROCHLORIDE 7.5 MG/ML
INJECTION, SOLUTION INTRASPINAL
Status: DISPENSED
Start: 2018-08-14 | End: 2018-08-15

## 2018-08-14 RX ORDER — DIPHENHYDRAMINE HYDROCHLORIDE 50 MG/ML
25 INJECTION INTRAMUSCULAR; INTRAVENOUS EVERY 6 HOURS PRN
Status: DISCONTINUED | OUTPATIENT
Start: 2018-08-14 | End: 2018-08-17 | Stop reason: HOSPADM

## 2018-08-14 RX ORDER — IBUPROFEN 400 MG/1
400 TABLET, FILM COATED ORAL EVERY 6 HOURS PRN
Status: DISCONTINUED | OUTPATIENT
Start: 2018-08-15 | End: 2018-08-17 | Stop reason: HOSPADM

## 2018-08-14 RX ORDER — ACETAMINOPHEN 325 MG/1
650 TABLET ORAL EVERY 4 HOURS PRN
Status: DISCONTINUED | OUTPATIENT
Start: 2018-08-17 | End: 2018-08-17 | Stop reason: HOSPADM

## 2018-08-14 RX ORDER — LIDOCAINE 40 MG/G
CREAM TOPICAL
Status: DISCONTINUED | OUTPATIENT
Start: 2018-08-14 | End: 2018-08-17 | Stop reason: HOSPADM

## 2018-08-14 RX ORDER — NALOXONE HYDROCHLORIDE 0.4 MG/ML
.1-.4 INJECTION, SOLUTION INTRAMUSCULAR; INTRAVENOUS; SUBCUTANEOUS
Status: DISCONTINUED | OUTPATIENT
Start: 2018-08-14 | End: 2018-08-17 | Stop reason: HOSPADM

## 2018-08-14 RX ORDER — MISOPROSTOL 200 UG/1
800 TABLET ORAL ONCE
Status: COMPLETED | OUTPATIENT
Start: 2018-08-14 | End: 2018-08-14

## 2018-08-14 RX ORDER — CEFAZOLIN SODIUM 2 G/100ML
2 INJECTION, SOLUTION INTRAVENOUS
Status: COMPLETED | OUTPATIENT
Start: 2018-08-14 | End: 2018-08-14

## 2018-08-14 RX ORDER — MORPHINE SULFATE 1 MG/ML
INJECTION, SOLUTION EPIDURAL; INTRATHECAL; INTRAVENOUS PRN
Status: DISCONTINUED | OUTPATIENT
Start: 2018-08-14 | End: 2018-08-14

## 2018-08-14 RX ORDER — OXYTOCIN 10 [USP'U]/ML
10 INJECTION, SOLUTION INTRAMUSCULAR; INTRAVENOUS
Status: DISCONTINUED | OUTPATIENT
Start: 2018-08-14 | End: 2018-08-17 | Stop reason: HOSPADM

## 2018-08-14 RX ORDER — METHYLERGONOVINE MALEATE 0.2 MG/ML
INJECTION INTRAVENOUS
Status: COMPLETED
Start: 2018-08-14 | End: 2018-08-14

## 2018-08-14 RX ORDER — ACETAMINOPHEN 325 MG/1
975 TABLET ORAL EVERY 8 HOURS
Status: COMPLETED | OUTPATIENT
Start: 2018-08-14 | End: 2018-08-17

## 2018-08-14 RX ORDER — EPHEDRINE SULFATE 50 MG/ML
INJECTION, SOLUTION INTRAMUSCULAR; INTRAVENOUS; SUBCUTANEOUS PRN
Status: DISCONTINUED | OUTPATIENT
Start: 2018-08-14 | End: 2018-08-14

## 2018-08-14 RX ORDER — IBUPROFEN 600 MG/1
600 TABLET, FILM COATED ORAL EVERY 6 HOURS PRN
Status: DISCONTINUED | OUTPATIENT
Start: 2018-08-15 | End: 2018-08-17 | Stop reason: HOSPADM

## 2018-08-14 RX ORDER — LANOLIN 100 %
OINTMENT (GRAM) TOPICAL
Status: DISCONTINUED | OUTPATIENT
Start: 2018-08-14 | End: 2018-08-17 | Stop reason: HOSPADM

## 2018-08-14 RX ORDER — DEXTROSE, SODIUM CHLORIDE, SODIUM LACTATE, POTASSIUM CHLORIDE, AND CALCIUM CHLORIDE 5; .6; .31; .03; .02 G/100ML; G/100ML; G/100ML; G/100ML; G/100ML
INJECTION, SOLUTION INTRAVENOUS CONTINUOUS
Status: DISCONTINUED | OUTPATIENT
Start: 2018-08-14 | End: 2018-08-17 | Stop reason: HOSPADM

## 2018-08-14 RX ORDER — OXYCODONE HYDROCHLORIDE 5 MG/1
5-10 TABLET ORAL
Status: DISCONTINUED | OUTPATIENT
Start: 2018-08-14 | End: 2018-08-17 | Stop reason: HOSPADM

## 2018-08-14 RX ORDER — AMOXICILLIN 250 MG
2 CAPSULE ORAL 2 TIMES DAILY PRN
Status: DISCONTINUED | OUTPATIENT
Start: 2018-08-14 | End: 2018-08-17 | Stop reason: HOSPADM

## 2018-08-14 RX ORDER — BISACODYL 10 MG
10 SUPPOSITORY, RECTAL RECTAL DAILY PRN
Status: DISCONTINUED | OUTPATIENT
Start: 2018-08-16 | End: 2018-08-17 | Stop reason: HOSPADM

## 2018-08-14 RX ORDER — HYDROMORPHONE HYDROCHLORIDE 1 MG/ML
.3-.5 INJECTION, SOLUTION INTRAMUSCULAR; INTRAVENOUS; SUBCUTANEOUS EVERY 30 MIN PRN
Status: DISCONTINUED | OUTPATIENT
Start: 2018-08-14 | End: 2018-08-17 | Stop reason: HOSPADM

## 2018-08-14 RX ORDER — OXYTOCIN/0.9 % SODIUM CHLORIDE 30/500 ML
100 PLASTIC BAG, INJECTION (ML) INTRAVENOUS CONTINUOUS
Status: DISCONTINUED | OUTPATIENT
Start: 2018-08-14 | End: 2018-08-17 | Stop reason: HOSPADM

## 2018-08-14 RX ORDER — MORPHINE SULFATE 1 MG/ML
INJECTION, SOLUTION EPIDURAL; INTRATHECAL; INTRAVENOUS
Status: COMPLETED
Start: 2018-08-14 | End: 2018-08-14

## 2018-08-14 RX ORDER — CEFAZOLIN SODIUM 1 G/3ML
1 INJECTION, POWDER, FOR SOLUTION INTRAMUSCULAR; INTRAVENOUS SEE ADMIN INSTRUCTIONS
Status: DISCONTINUED | OUTPATIENT
Start: 2018-08-14 | End: 2018-08-14

## 2018-08-14 RX ORDER — HYDROCORTISONE 2.5 %
CREAM (GRAM) TOPICAL 3 TIMES DAILY PRN
Status: DISCONTINUED | OUTPATIENT
Start: 2018-08-14 | End: 2018-08-17 | Stop reason: HOSPADM

## 2018-08-14 RX ORDER — KETOROLAC TROMETHAMINE 30 MG/ML
INJECTION, SOLUTION INTRAMUSCULAR; INTRAVENOUS
Status: DISPENSED
Start: 2018-08-14 | End: 2018-08-15

## 2018-08-14 RX ADMIN — TRIAMCINOLONE ACETONIDE 20 MG: 10 INJECTION, SUSPENSION INTRA-ARTICULAR; INTRALESIONAL at 10:15

## 2018-08-14 RX ADMIN — PHENYLEPHRINE HYDROCHLORIDE 150 MCG: 10 INJECTION, SOLUTION INTRAMUSCULAR; INTRAVENOUS; SUBCUTANEOUS at 09:54

## 2018-08-14 RX ADMIN — PHENYLEPHRINE HYDROCHLORIDE 100 MCG: 10 INJECTION, SOLUTION INTRAMUSCULAR; INTRAVENOUS; SUBCUTANEOUS at 10:12

## 2018-08-14 RX ADMIN — Medication 340 ML/HR: at 09:32

## 2018-08-14 RX ADMIN — MORPHINE SULFATE 0.1 MG: 1 INJECTION, SOLUTION EPIDURAL; INTRATHECAL; INTRAVENOUS at 09:11

## 2018-08-14 RX ADMIN — Medication 10 MG: at 09:24

## 2018-08-14 RX ADMIN — PHENYLEPHRINE HYDROCHLORIDE 200 MCG: 10 INJECTION, SOLUTION INTRAMUSCULAR; INTRAVENOUS; SUBCUTANEOUS at 09:46

## 2018-08-14 RX ADMIN — METHYLERGONOVINE MALEATE 200 MCG: 0.2 INJECTION INTRAVENOUS at 11:29

## 2018-08-14 RX ADMIN — BUPIVACAINE HYDROCHLORIDE IN DEXTROSE 12 MG: 7.5 INJECTION, SOLUTION SUBARACHNOID at 09:11

## 2018-08-14 RX ADMIN — SODIUM CHLORIDE, SODIUM LACTATE, POTASSIUM CHLORIDE, CALCIUM CHLORIDE AND DEXTROSE MONOHYDRATE: 5; 600; 310; 30; 20 INJECTION, SOLUTION INTRAVENOUS at 21:59

## 2018-08-14 RX ADMIN — PHENYLEPHRINE HYDROCHLORIDE 200 MCG: 10 INJECTION, SOLUTION INTRAMUSCULAR; INTRAVENOUS; SUBCUTANEOUS at 09:59

## 2018-08-14 RX ADMIN — PHENYLEPHRINE HYDROCHLORIDE 100 MCG: 10 INJECTION, SOLUTION INTRAMUSCULAR; INTRAVENOUS; SUBCUTANEOUS at 09:21

## 2018-08-14 RX ADMIN — PHENYLEPHRINE HYDROCHLORIDE 100 MCG: 10 INJECTION, SOLUTION INTRAMUSCULAR; INTRAVENOUS; SUBCUTANEOUS at 09:15

## 2018-08-14 RX ADMIN — SODIUM CITRATE AND CITRIC ACID MONOHYDRATE 30 ML: 500; 334 SOLUTION ORAL at 09:02

## 2018-08-14 RX ADMIN — SODIUM CHLORIDE, POTASSIUM CHLORIDE, SODIUM LACTATE AND CALCIUM CHLORIDE 1000 ML: 600; 310; 30; 20 INJECTION, SOLUTION INTRAVENOUS at 07:27

## 2018-08-14 RX ADMIN — PHENYLEPHRINE HYDROCHLORIDE 100 MCG: 10 INJECTION, SOLUTION INTRAMUSCULAR; INTRAVENOUS; SUBCUTANEOUS at 09:52

## 2018-08-14 RX ADMIN — MISOPROSTOL 800 MCG: 200 TABLET ORAL at 12:20

## 2018-08-14 RX ADMIN — SODIUM CHLORIDE, POTASSIUM CHLORIDE, SODIUM LACTATE AND CALCIUM CHLORIDE: 600; 310; 30; 20 INJECTION, SOLUTION INTRAVENOUS at 07:55

## 2018-08-14 RX ADMIN — CEFAZOLIN SODIUM 2 G: 2 INJECTION, SOLUTION INTRAVENOUS at 09:16

## 2018-08-14 RX ADMIN — ONDANSETRON 4 MG: 2 INJECTION INTRAMUSCULAR; INTRAVENOUS at 09:07

## 2018-08-14 RX ADMIN — ACETAMINOPHEN 975 MG: 325 TABLET, FILM COATED ORAL at 21:58

## 2018-08-14 RX ADMIN — OXYTOCIN 30 UNITS: 10 INJECTION, SOLUTION INTRAMUSCULAR; INTRAVENOUS at 11:59

## 2018-08-14 RX ADMIN — SENNOSIDES AND DOCUSATE SODIUM 1 TABLET: 8.6; 5 TABLET ORAL at 20:33

## 2018-08-14 RX ADMIN — PHENYLEPHRINE HYDROCHLORIDE 100 MCG: 10 INJECTION, SOLUTION INTRAMUSCULAR; INTRAVENOUS; SUBCUTANEOUS at 09:38

## 2018-08-14 RX ADMIN — ACETAMINOPHEN 975 MG: 325 TABLET, FILM COATED ORAL at 14:16

## 2018-08-14 RX ADMIN — KETOROLAC TROMETHAMINE 30 MG: 30 INJECTION, SOLUTION INTRAMUSCULAR at 20:33

## 2018-08-14 RX ADMIN — SODIUM CHLORIDE, POTASSIUM CHLORIDE, SODIUM LACTATE AND CALCIUM CHLORIDE: 600; 310; 30; 20 INJECTION, SOLUTION INTRAVENOUS at 10:23

## 2018-08-14 RX ADMIN — PHENYLEPHRINE HYDROCHLORIDE 150 MCG: 10 INJECTION, SOLUTION INTRAMUSCULAR; INTRAVENOUS; SUBCUTANEOUS at 09:42

## 2018-08-14 RX ADMIN — SODIUM CHLORIDE, POTASSIUM CHLORIDE, SODIUM LACTATE AND CALCIUM CHLORIDE: 600; 310; 30; 20 INJECTION, SOLUTION INTRAVENOUS at 09:38

## 2018-08-14 RX ADMIN — OXYTOCIN 100 ML/HR: 10 INJECTION, SOLUTION INTRAMUSCULAR; INTRAVENOUS at 17:10

## 2018-08-14 RX ADMIN — PHENYLEPHRINE HYDROCHLORIDE 100 MCG: 10 INJECTION, SOLUTION INTRAMUSCULAR; INTRAVENOUS; SUBCUTANEOUS at 09:25

## 2018-08-14 RX ADMIN — KETOROLAC TROMETHAMINE 30 MG: 30 INJECTION, SOLUTION INTRAMUSCULAR at 14:15

## 2018-08-14 RX ADMIN — PHENYLEPHRINE HYDROCHLORIDE 100 MCG: 10 INJECTION, SOLUTION INTRAMUSCULAR; INTRAVENOUS; SUBCUTANEOUS at 10:16

## 2018-08-14 RX ADMIN — PHENYLEPHRINE HYDROCHLORIDE 100 MCG: 10 INJECTION, SOLUTION INTRAMUSCULAR; INTRAVENOUS; SUBCUTANEOUS at 09:30

## 2018-08-14 NOTE — PLAN OF CARE
Problem:  Delivery (Adult,Obstetrics,Pediatric)  Goal: Signs and Symptoms of Listed Potential Problems Will be Absent, Minimized or Managed ( Delivery)  Signs and symptoms of listed potential problems will be absent, minimized or managed by discharge/transition of care (reference  Delivery (Adult,Obstetrics,Pediatric) CPG).   Outcome: Completed Date Met: 18  VSS, assessment WNL.  FF U/1, light rubra noted; few small clots with first PACU check.  LTI with underlying staples and steristrips; serous draining noted under tegaderm dressing.  Denies pain at this time.   present and supportive.  Patient denies needs.

## 2018-08-14 NOTE — BRIEF OP NOTE
[facility] Brief Operative Note    Pre-operative diagnosis: PREVIOUS  section x 2, keloid scar   Post-operative diagnosis Same as above   Procedure: Repeat LTCS and excision of keloid scar   Surgeon(s): Yeny Corey MD - Primary   Estimated blood loss: 800 cc   Specimens: Cord blood   Findings: Normal ovaries and tubes

## 2018-08-14 NOTE — ANESTHESIA PREPROCEDURE EVALUATION
Anesthesia Evaluation     .             ROS/MED HX    ENT/Pulmonary:  - neg pulmonary ROS    (-) sleep apnea   Neurologic:  - neg neurologic ROS     Cardiovascular:  - neg cardiovascular ROS       METS/Exercise Tolerance:     Hematologic:  - neg hematologic  ROS       Musculoskeletal:  - neg musculoskeletal ROS       GI/Hepatic:        (-) GERD   Renal/Genitourinary:     (+) Other Renal/ Genitourinary, term pregnancy for repeat C/S      Endo:  - neg endo ROS       Psychiatric:  - neg psychiatric ROS       Infectious Disease:  - neg infectious disease ROS       Malignancy:         Other:                     Physical Exam  Normal systems: cardiovascular, pulmonary and dental    Airway   Mallampati: II  TM distance: >3 FB  Neck ROM: full    Dental     Cardiovascular       Pulmonary                     Anesthesia Plan      History & Physical Review  History and physical reviewed and following examination; no interval change.    ASA Status:  2 .    NPO Status:  > 8 hours    Plan for Spinal   PONV prophylaxis:  Ondansetron (or other 5HT-3)       Postoperative Care  Postoperative pain management:  IV analgesics.      Consents  Anesthetic plan, risks, benefits and alternatives discussed with:  Patient.  Use of blood products discussed: Yes.   Use of blood products discussed with Patient.  Consented to blood products.  .        Procedure: Procedure(s):   SECTION  Preop diagnosis: PREVIOUS    Allergies not on file  No past medical history on file.  No past surgical history on file.  Social History   Substance Use Topics     Smoking status: Not on file     Smokeless tobacco: Not on file     Alcohol use Not on file     Prior to Admission medications    Medication Sig Start Date End Date Taking? Authorizing Provider   progesterone (PROMETRIUM) 200 MG capsule Take 1 capsule (200 mg) by mouth daily 18  Guanako Carreon MD   progesterone (PROMETRIUM) 200 MG capsule Place 1 capsule (200 mg) vaginally daily  4/24/18 8/22/18  Guanako Carreon MD     No current Epic-ordered facility-administered medications on file.      Current Outpatient Prescriptions Ordered in Epic   Medication     progesterone (PROMETRIUM) 200 MG capsule     progesterone (PROMETRIUM) 200 MG capsule       Wt Readings from Last 1 Encounters:   No data found for Wt     Temp Readings from Last 1 Encounters:   No data found for Temp     BP Readings from Last 6 Encounters:   No data found for BP     Pulse Readings from Last 4 Encounters:   No data found for Pulse     Resp Readings from Last 1 Encounters:   No data found for Resp     SpO2 Readings from Last 1 Encounters:   No data found for SpO2     No results for input(s): NA, POTASSIUM, CHLORIDE, CO2, ANIONGAP, GLC, BUN, CR, FAWN in the last 51804 hours.  No results for input(s): AST, ALT, ALKPHOS, BILITOTAL, LIPASE in the last 21593 hours.  No results for input(s): WBC, HGB, PLT in the last 69793 hours.  No results for input(s): ABO, RH in the last 31261 hours.  No results for input(s): INR, PTT in the last 14494 hours.   No results for input(s): TROPI in the last 59322 hours.  No results for input(s): PH, PCO2, PO2, HCO3 in the last 70204 hours.  No results for input(s): HCG in the last 22830 hours.  No results found for this or any previous visit (from the past 744 hour(s)).    RECENT LABS:   ECG:   ECHO:                         .

## 2018-08-14 NOTE — ANESTHESIA POSTPROCEDURE EVALUATION
Patient: Erin Oro    Procedure(s):  REPEAT  SECTION - Wound Class: II-Clean Contaminated    Diagnosis:PREVIOUS  Diagnosis Additional Information: No value filed.    Anesthesia Type:  Spinal    Note:  Anesthesia Post Evaluation    Patient location during evaluation: bedside  Patient participation: Able to participate in evaluation but full recovery from regional anesthesia has not yet ocurrred but is anticipated to occur within 48 hours  Level of consciousness: awake  Pain management: adequate  Airway patency: patent  Cardiovascular status: acceptable  Respiratory status: acceptable  Hydration status: acceptable  PONV: none     Anesthetic complications: None          Last vitals:  Vitals:    18 1300 18 1330 18 1430   BP: 123/77 112/70 120/69   Pulse: 62 65 66   Resp: 16 16 16   Temp: 37  C (98.6  F)     SpO2: 95% 96% 96%         Electronically Signed By: Kirill Butt DO, DO  2018  2:31 PM

## 2018-08-14 NOTE — IP AVS SNAPSHOT
MRN:[id number]                      After Visit Summary   2018    Erin Oro    MRN: [id number]           Thank you!     Thank you for choosing [facility] for your care. Our goal is always to provide you with excellent care. Hearing back from our patients is one way we can continue to improve our services. Please take a few minutes to complete the written survey that you may receive in the mail after you visit with us. Thank you!        Patient Information     Date Of Birth          1980        Designated Caregiver       Most Recent Value    Caregiver    Will someone help with your care after discharge? no      About your hospital stay     You were admitted on:  2018 You last received care in the:  [address]    You were discharged on:  2018        Reason for your hospital stay       Maternity care                  Who to Call     For medical emergencies, please call 911.  For non-urgent questions about your medical care, please call your primary care provider or clinic, [phone]  For questions related to your surgery, please call your surgery clinic        Attending Provider     Provider Specialty    Yeny Corey MD OB/Gyn       Primary Care Provider Office Phone # Fax #    Yeny Corey [phone]      After Care Instructions     Activity       Review discharge instructions            Diet       Resume previous diet            Discharge Instructions - Postpartum visit       Schedule postpartum visit with your provider and return to clinic in 6 weeks.                  Further instructions from your care team       Postop  Birth Instructions    Activity       Do not lift more than 10 pounds for 6 weeks after surgery.  Ask family and friends for help when you need it.    No driving until you have stopped taking your pain medications (usually two weeks after surgery).    No heavy exercise or activity for 6  weeks.  Don't do anything that will put a strain on your surgery site.    Don't strain when using the toilet.  Your care team may prescribe a stool softener if you have problems with your bowel movements.     To care for your incision:       Keep the incision clean and dry.    Do not soak your incision in water. No swimming or hot tubs until it has fully healed. You may soak in the bathtub if the water level is below your incision.    Do not use peroxide, gel, cream, lotion, or ointment on your incision.    Adjust your clothes to avoid pressure on your surgery site (check the elastic in your underwear for example).     You may see a small amount of clear or pink drainage and this is normal.  Check with your health care provider:       If the drainage increases or has an odor.    If the incision reddens, you have swelling, or develop a rash.    If you have increased pain and the medicine we prescribed doesn't help.    If you have a fever above 100.4 F (38 C) with or without chills when placing thermometer under your tongue.   The area around your incision (surgery wound), will feel numb.  This is normal. The numbness should go away in less than a year.     Keep your hands clean:  Always wash your hands before touching your incision (surgery wound). This helps reduce your risk of infection. If your hands aren't dirty, you may use an alcohol hand-rub to clean your hands. Keep your nails clean and short.    Call your healthcare provider if you have any of these symptoms:       You soak a sanitary pad with blood within 1 hour, or you see blood clots larger than a golf ball.    Bleeding that lasts more than 6 weeks.    Vaginal discharge that smells bad.    Severe pain, cramping or tenderness in your lower belly area.    A need to urinate more frequently (use the toilet more often), more urgently (use the toilet very quickly), or it burns when you urinate.    Nausea and vomiting.    Redness, swelling or pain around a vein  "in your leg.    Problems breastfeeding or a red or painful area on your breast.    Chest pain and cough or are gasping for air.    Problems with coping with sadness, anxiety or depression. If you have concerns about hurting yourself or the baby, call your provider immediately.      You have questions or concerns after you return home.                  Pending Results     No orders found from 2018 to 8/15/2018.            Statement of Approval     Ordered          18 0837  I have reviewed and agree with all the recommendations and orders detailed in this document.  EFFECTIVE NOW     Approved and electronically signed by:  Roxy Ochoa MD             Admission Information     Date & Time Provider Department Dept. Phone    2018 Yeny Corey MD [address] [phone]      Your Vitals Were     Blood Pressure Pulse Temperature Respirations Height Weight    110/74 66 98  F (36.7  C) (Oral) 16 1.651 m (5' 5\") 67.1 kg (148 lb)    Last Period Pulse Oximetry BMI (Body Mass Index)             2017 98% 24.63 kg/m2         [facility] Information     [employer] lets you send messages to your doctor, view your test results, renew your prescriptions, schedule appointments and more. To sign up, go to [email or web address] . Click on \"Log in\" on the left side of the screen, which will take you to the Welcome page. Then click on \"Sign up Now\" on the right side of the page.     You will be asked to enter the access code listed below, as well as some personal information. Please follow the directions to create your username and password.     Your access code is: [email or web address]  Expires: 11/15/2018 11:08 AM     Your access code will  in 90 days. If you need help or a new code, please call your [facility] clinic or [phone].        Care EveryWhere ID     This is your Care EveryWhere ID. This could be used by other organizations to access your [facility] medical records  [id number]      "   Equal Access to Services     [facility] Hospital[facility]OS[facility] : Hadii aad ku hadzoëjocelin Bhavanabenjamin, waaxda luqadaha, qaybta kapegjose stevenson. So [facility] [phone].    ATENCIÓN: Si habla español, tiene a vaughn disposición servicios gratuitos de asistencia lingüística. Llame al [phone].    We comply with applicable federal civil rights laws and [place] laws. We do not discriminate on the basis of race, color, national origin, age, disability, sex, sexual orientation, or gender identity.               Review of your medicines      START taking        Dose / Directions    oxyCODONE IR 5 MG tablet   Commonly known as:  ROXICODONE        Dose:  5 mg   Take 1 tablet (5 mg) by mouth every 4 hours as needed for other (pain control or improvement in physical function. Hold dose for analgesic side effects.)   Quantity:  12 tablet   Refills:  0         CONTINUE these medicines which have NOT CHANGED        Dose / Directions    PNV PO        Dose:  1 tablet   Take 1 tablet by mouth daily   Refills:  0       progesterone 200 MG capsule   Commonly known as:  PROMETRIUM   Used for:  Short cervix during pregnancy in second trimester        Dose:  200 mg   Place 1 capsule (200 mg) vaginally daily   Quantity:  30 capsule   Refills:  3            Where to get your medicines      Some of these will need a paper prescription and others can be bought over the counter. Ask your nurse if you have questions.     Bring a paper prescription for each of these medications     oxyCODONE IR 5 MG tablet                Protect others around you: Learn how to safely use, store and throw away your medicines at www.disposemymeds.org.        Information about OPIOIDS     PRESCRIPTION OPIOIDS: WHAT YOU NEED TO KNOW   We gave you an opioid (narcotic) pain medicine. It is important to manage your pain, but opioids are not always the best choice. You should first try all the other options your care team gave you. Take this medicine  for as short a time (and as few doses) as possible.    Some activities can increase your pain, such as bandage changes or therapy sessions. It may help to take your pain medicine 30 to 60 minutes before these activities. Reduce your stress by getting enough sleep, working on hobbies you enjoy and practicing relaxation or meditation. Talk to your care team about ways to manage your pain beyond prescription opioids.    These medicines have risks:    DO NOT drive when on new or higher doses of pain medicine. These medicines can affect your alertness and reaction times, and you could be arrested for driving under the influence (DUI). If you need to use opioids long-term, talk to your care team about driving.    DO NOT operate heavy machinery    DO NOT do any other dangerous activities while taking these medicines.    DO NOT drink any alcohol while taking these medicines.     If the opioid prescribed includes acetaminophen, DO NOT take with any other medicines that contain acetaminophen. Read all labels carefully. Look for the word  acetaminophen  or  Tylenol.  Ask your pharmacist if you have questions or are unsure.    You can get addicted to pain medicines, especially if you have a history of addiction (chemical, alcohol or substance dependence). Talk to your care team about ways to reduce this risk.    All opioids tend to cause constipation. Drink plenty of water and eat foods that have a lot of fiber, such as fruits, vegetables, prune juice, apple juice and high-fiber cereal. Take a laxative (Miralax, milk of magnesia, Colace, Senna) if you don t move your bowels at least every other day. Other side effects include upset stomach, sleepiness, dizziness, throwing up, tolerance (needing more of the medicine to have the same effect), physical dependence and slowed breathing.    Store your pills in a secure place, locked if possible. We will not replace any lost or stolen medicine. If you don t finish your medicine,  please throw away (dispose) as directed by your pharmacist. The [place] Pollution Control Agency has more information about safe disposal: https://www.pca.state.[place]/living-green/managing-unwanted-medications             Medication List: This is a list of all your medications and when to take them. Check marks below indicate your daily home schedule. Keep this list as a reference.      Medications           Morning Afternoon Evening Bedtime As Needed    oxyCODONE IR 5 MG tablet   Commonly known as:  ROXICODONE   Take 1 tablet (5 mg) by mouth every 4 hours as needed for other (pain control or improvement in physical function. Hold dose for analgesic side effects.)   Last time this was given:  5 mg on 8/15/2018  7:38 PM                                PNV PO   Take 1 tablet by mouth daily                                progesterone 200 MG capsule   Commonly known as:  PROMETRIUM   Place 1 capsule (200 mg) vaginally daily

## 2018-08-14 NOTE — ANESTHESIA PROCEDURE NOTES
Peripheral nerve/Neuraxial procedure note : intrathecal  Pre-Procedure  Performed by LITA GANN  Location: OR      Pre-Anesthestic Checklist: patient identified, IV checked, site marked, risks and benefits discussed, informed consent, monitors and equipment checked, pre-op evaluation, at physician/surgeon's request and post-op pain management    Timeout  Correct Patient: Yes   Correct Procedure: Yes   Correct Site: Yes   Correct Laterality: Yes   Correct Position: Yes   Site Marked: Yes   .   Procedure Documentation    .    Procedure:    Intrathecal.  Insertion Site:L3-4  (midline approach)      Patient Prep;mask, sterile gloves, povidone-iodine 7.5% surgical scrub, patient draped.  .  Needle: Stephenie tip Spinal Needle (gauge): 25  Spinal/LP Needle Length (inches): 3.5 # of attempts: 1 and # of redirects:  Introducer used Introducer: 20 G .       Assessment/Narrative  .  .  clear CSF fluid removed while sitting   . Comments:  1.6ml 0.75% Bupiv with dextrose, Epi wash, and 0.1mg Duramorph

## 2018-08-14 NOTE — PLAN OF CARE
Fundus somewhat boggy prior to massage; rubra light to moderate.  Page to Dr. HETAL Corey; see new orders.

## 2018-08-14 NOTE — PROGRESS NOTES
Called for uterine atony. Patient given IV pitocin, Methergine 0.2mg IM and recently Cytotec 800 mcg intrarectally. Bleeding has now slowed.     Exam fundus firm and non tender  Incision  Small amount of watery blooding drainage  Perineum  Small amount of flow    A: POD 0 s/p repeat c/s      Uterine atony now responding to the above medications       Incision with some oozing   P: pressure dressing to incision      CBC platelet, PT, PTT now

## 2018-08-14 NOTE — PLAN OF CARE
Problem:  Delivery (Adult,Obstetrics,Pediatric)  Goal: Signs and Symptoms of Listed Potential Problems Will be Absent, Minimized or Managed ( Delivery)  Signs and symptoms of listed potential problems will be absent, minimized or managed by discharge/transition of care (reference  Delivery (Adult,Obstetrics,Pediatric) CPG).   Outcome: Therapy, progress toward functional goals as expected   3 para 2 at 39 0/7 weeks gestation here for repeat  section.  External monitors applied, assessment and admission completed.  Physician discusses procedure with patient; questions asked and answered.  Pt prepared for  section in usual fashion.

## 2018-08-14 NOTE — ADDENDUM NOTE
Addendum  created 08/14/18 1610 by Kirill Butt, DO    Anesthesia Review and Sign - Ready for Procedure      
5

## 2018-08-14 NOTE — PLAN OF CARE
Report received from Leann MONROY RN in PACU. Dr oCrey at bedside to assess bleeding at 1245. Bleeding improved, pt received methergine and cytotec. Drainage under tegaderm, pressure dressing applied per MD request. Transferred up to room 421 at 1300. Oriented to room and surroundings. Pt and  educated on infant safety and security. Declines pain meds, pain tolerable. Pt feeling dizzy. VSS. Working on breastfeeding.Will continue to monitor.

## 2018-08-14 NOTE — ANESTHESIA CARE TRANSFER NOTE
Patient: Erin Oro    Procedure(s):  REPEAT  SECTION - Wound Class: II-Clean Contaminated    Diagnosis: PREVIOUS  Diagnosis Additional Information: No value filed.    Anesthesia Type:   Spinal     Note:  Airway :Room Air  Patient transferred to:Labor and Delivery  Comments: At end of procedure, spontaneous respirations, patient alert to voice, able to follow commands. Patient breathing room air at room air to PACU. Oxygen tubing connected to wall O2 in PACU, SpO2, NiBP, and EKG monitors and alarms on and functioning, Juarez Hugger warmer connected to patient gown, report on patient's clinical status given to PACU RN, RN questions answered.Handoff Report: Identifed the Patient, Identified the Reponsible Provider, Reviewed the pertinent medical history, Discussed the surgical course, Reviewed Intra-OP anesthesia mangement and issues during anesthesia, Set expectations for post-procedure period and Allowed opportunity for questions and acknowledgement of understanding      Vitals: (Last set prior to Anesthesia Care Transfer)    CRNA VITALS  2018 0952 - 2018 1031      2018             Pulse: 64    SpO2: 96 %    Resp Rate (set): 10                Electronically Signed By: SHELBI Barbosa CRNA  2018  10:31 AM

## 2018-08-14 NOTE — IP AVS SNAPSHOT
[address]    [place] [address]    Phone:  [phone]                                       After Visit Summary   8/14/2018    Erin Oro    MRN: [id number]           After Visit Summary Signature Page     I have received my discharge instructions, and my questions have been answered. I have discussed any challenges I see with this plan with the nurse or doctor.    ..........................................................................................................................................  Patient/Patient Representative Signature      ..........................................................................................................................................  Patient Representative Print Name and Relationship to Patient    ..................................................               ................................................  Date                                            Time    ..........................................................................................................................................  Reviewed by Signature/Title    ...................................................              ..............................................  Date                                                            Time

## 2018-08-15 LAB — HGB BLD-MCNC: 10.3 G/DL (ref 11.7–15.7)

## 2018-08-15 PROCEDURE — 36415 COLL VENOUS BLD VENIPUNCTURE: CPT | Performed by: OBSTETRICS & GYNECOLOGY

## 2018-08-15 PROCEDURE — 25000128 H RX IP 250 OP 636: Performed by: OBSTETRICS & GYNECOLOGY

## 2018-08-15 PROCEDURE — 25000132 ZZH RX MED GY IP 250 OP 250 PS 637: Performed by: OBSTETRICS & GYNECOLOGY

## 2018-08-15 PROCEDURE — 85018 HEMOGLOBIN: CPT | Performed by: OBSTETRICS & GYNECOLOGY

## 2018-08-15 PROCEDURE — 12000037 ZZH R&B POSTPARTUM INTERMEDIATE

## 2018-08-15 RX ADMIN — KETOROLAC TROMETHAMINE 30 MG: 30 INJECTION, SOLUTION INTRAMUSCULAR at 01:34

## 2018-08-15 RX ADMIN — ACETAMINOPHEN 975 MG: 325 TABLET, FILM COATED ORAL at 05:42

## 2018-08-15 RX ADMIN — ACETAMINOPHEN 975 MG: 325 TABLET, FILM COATED ORAL at 22:07

## 2018-08-15 RX ADMIN — IBUPROFEN 800 MG: 400 TABLET ORAL at 22:07

## 2018-08-15 RX ADMIN — OXYCODONE HYDROCHLORIDE 5 MG: 5 TABLET ORAL at 14:47

## 2018-08-15 RX ADMIN — SENNOSIDES AND DOCUSATE SODIUM 1 TABLET: 8.6; 5 TABLET ORAL at 08:00

## 2018-08-15 RX ADMIN — OXYCODONE HYDROCHLORIDE 5 MG: 5 TABLET ORAL at 19:38

## 2018-08-15 RX ADMIN — IBUPROFEN 800 MG: 400 TABLET ORAL at 14:47

## 2018-08-15 RX ADMIN — KETOROLAC TROMETHAMINE 30 MG: 30 INJECTION, SOLUTION INTRAMUSCULAR at 08:10

## 2018-08-15 RX ADMIN — SENNOSIDES AND DOCUSATE SODIUM 2 TABLET: 8.6; 5 TABLET ORAL at 19:38

## 2018-08-15 RX ADMIN — OXYCODONE HYDROCHLORIDE 5 MG: 5 TABLET ORAL at 09:28

## 2018-08-15 RX ADMIN — ACETAMINOPHEN 975 MG: 325 TABLET, FILM COATED ORAL at 13:12

## 2018-08-15 NOTE — PLAN OF CARE
Problem: Patient Care Overview  Goal: Plan of Care/Patient Progress Review  Outcome: Improving  Patient doing well, having minimal complaints of incisional discomfort.  Using abdominal binder.  Fundus firm below umbilicus, lochia within normal limits, no clots.  Salmeron removed at 0945, voiding adequately.  Saline lock removed.  Tolerating a regular diet, passing flatus.  Lanolin cream given for tender nipples.  Encouraged to call with questions/concerns.

## 2018-08-15 NOTE — PLAN OF CARE
Problem: Patient Care Overview  Goal: Plan of Care/Patient Progress Review  Outcome: No Change  Vital signs stable. Adequate and clear urine output. Lung sounds clear. Pain under control with tylenol and ibuprofen.Working on breast feeding baby on demand. Encourage to call with questions or concerns. Will continue to monitor.

## 2018-08-15 NOTE — OP NOTE
Procedure Date: 2018      PREOPERATIVE DIAGNOSES:   1.  Intrauterine pregnancy at 39 weeks.   2.  Previous  section x 2 keloid scar.      POSTOPERATIVE DIAGNOSES:   1.  Intrauterine pregnancy at 39 weeks.   2.  Previous  section x 2 keloid scar.      PROCEDURE:  Repeat low-transverse  section and removal of keloid scar.      SURGEON:  Dr. Yeny Corey      ANESTHESIA:  Spinal.      ESTIMATED BLOOD LOSS:  800 mL.      COMPLICATIONS:  None.      DESCRIPTION OF PROCEDURE:  The patient was given the risks and benefits and strongly desired to proceed with a repeat  section.  She was brought to the operating room, where adequate spinal anesthesia was placed by Dr. Butt.  The patient was placed in the supine position, left lateral tilt.  A Salmeron catheter was placed in the patient's bladder.  Clear urine was noted.  Fetal heart tones before, and after placement of the spinal, were category 1.  The patient was prepped, a 3-minute wait time was observed.  The patient was draped.  A timeout was taken.  The patient passed the pinch test.  The low transverse skin keloid scar was excised.  The abdomen was entered using the Pfannenstiel method.  Retractors were placed.  The uterine wall where the previous  sections had been performed was extremely thin.  An incision was made in this area with a knife.  The bag of niño was entered.  Clear fluid was noted.  The incision was widened in a cephalocaudal manner.  The head delivered easily through the incision.  The mouth and nares were bulb suctioned.  The rest of the baby was delivered easily with fundal pressure.  The baby was a male infant who was vigorous and crying upon delivery.  Delayed cord clamping for 30 seconds was observed.  The cord was doubly clamped and ligated.  The baby had Apgars of 8 at 1 minute and 9 at 5 minutes.  It weighed 6 pounds 11 ounces.  Cord bloods were obtained.  Placenta delivered with uterine massage, it  was noted to be intact with 3 vessels.  The uterine cavity was cleaned with a dry lap.  The uterine incision was closed in two layers; the first layer was a running nonlocking using 0 Monocryl suture.  Of note, the lower part of this incision was extremely thin.  A second imbricating layer was placed using the same suture.  Hemostasis was noted.  The ovaries and tubes were inspected and noted to be normal.  The pelvis and abdomen were irrigated and suctioned with normal saline.  The anterior peritoneum and rectus muscles were inspected and noted to be hemostatic.  The anterior peritoneum was reapproximated using 3-0 Vicryl suture in continuous nonlocking manner.  The fascia was closed using 0 PDS in a continuous nonlocking manner.  The incision was irrigated with normal saline.  There was no bleeding noted.  The subcutaneous Derik's layer was reapproximated using 3-0 Vicryl suture in a running nonlocking manner.  The skin was closed with Insorb staples.  There was one area that was closed with 4-0 Monocryl.  Steri-Strips were placed.  Tegaderm dressing was placed.  Sponge and needle counts were correct x 3. Patient did receive IV Pitocin.  There was initially uterine atony after delivery of the placenta, which was managed with the IV Pitocin and manual massage.         MOON CARTER MD             D: 2018   T: 2018   MT: CIPRIANO      Name:     MARCUS PUGH   MRN:      [id number]        Account:        [id number]   :      1980           Procedure Date: 2018      Document: [id number]

## 2018-08-15 NOTE — PLAN OF CARE
Problem: Patient Care Overview  Goal: Plan of Care/Patient Progress Review  Outcome: No Change  VSS. Incision C/D/I. Pt continues to feel slightly dizzy.Up to bathroom with assist, tolerated well. Pain well controlled with Tylenol and Toradol. IVF infusing at 100 mL/hr. Salmeron is patent with adequate urine output. Tolerating a clear liquid diet. Working on breastfeeding  and  cares. Continue to monitor and notify MD as needed.

## 2018-08-15 NOTE — LACTATION NOTE
Initial Lactation visit. Hand out given. Recommend unlimited, frequent breast feedings: At least 8 - 12 times every 24 hours. Avoid pacifiers and supplementation with formula unless medically indicated. Explained benefits of holding baby skin on skin to help promote better breastfeeding outcomes.     Eunsun states breastfeeding is going well so far. She denies questions or concerns at this time. Will revisit as needed.    Rosetta Nava RN IBCLC

## 2018-08-16 PROCEDURE — 12000037 ZZH R&B POSTPARTUM INTERMEDIATE

## 2018-08-16 PROCEDURE — 25000132 ZZH RX MED GY IP 250 OP 250 PS 637: Performed by: OBSTETRICS & GYNECOLOGY

## 2018-08-16 RX ADMIN — IBUPROFEN 800 MG: 400 TABLET ORAL at 16:02

## 2018-08-16 RX ADMIN — SENNOSIDES AND DOCUSATE SODIUM 2 TABLET: 8.6; 5 TABLET ORAL at 09:04

## 2018-08-16 RX ADMIN — SENNOSIDES AND DOCUSATE SODIUM 2 TABLET: 8.6; 5 TABLET ORAL at 20:34

## 2018-08-16 RX ADMIN — ACETAMINOPHEN 975 MG: 325 TABLET, FILM COATED ORAL at 13:26

## 2018-08-16 RX ADMIN — IBUPROFEN 800 MG: 400 TABLET ORAL at 10:31

## 2018-08-16 RX ADMIN — ACETAMINOPHEN 975 MG: 325 TABLET, FILM COATED ORAL at 05:56

## 2018-08-16 RX ADMIN — IBUPROFEN 800 MG: 400 TABLET ORAL at 04:24

## 2018-08-16 RX ADMIN — ACETAMINOPHEN 975 MG: 325 TABLET, FILM COATED ORAL at 22:21

## 2018-08-16 NOTE — PLAN OF CARE
Problem: Patient Care Overview  Goal: Plan of Care/Patient Progress Review  Outcome: No Change  VSS. Ambulating independently in the room. Pain well controlled with PRN pain medications. Working on breastfeeding  and  cares. Continue to monitor and notify MD as needed.

## 2018-08-16 NOTE — PLAN OF CARE
Problem: Patient Care Overview  Goal: Plan of Care/Patient Progress Review  Outcome: No Change  VSS. Pain well controlled, requesting prn pain med's as needed. Independent with cares.  Working on breastfeeding  and  cares. Continue to monitor and notify MD as needed.

## 2018-08-16 NOTE — PROGRESS NOTES
[facility]   Obstetrics Progress Note    Subjective: This is the patient's second day since  delivery. She is doing well.  She is urinating on her own. Pain is controlled with medication.    Objective:   All vitals stable  Temp: 98.2  F (36.8  C) Temp src: Oral BP: 106/63   Heart Rate: 64 Resp: 16 SpO2: 98 %        EXAM:  Constitutional: healthy, alert, no distress.   Abdomen: Abdomen soft, non-tender. BS normal. No masses, fundus is firm.  JOINT/EXTREMITIES: extremities normal   Incision: clean, dry and intact    Last hemoglobin was   Hemoglobin   Date Value Ref Range Status   08/15/2018 10.3 (L) 11.7 - 15.7 g/dL Final   ]    Assessment: Stable postpartum course.    Plan: Routine care. Ambulation encouraged  Breast feeding strategies discussed  Pain control measures as needed  Reportable signs and symptoms dicussed with the patient  Anticipate discharge tomorrow    Talya Renner

## 2018-08-17 VITALS
RESPIRATION RATE: 16 BRPM | SYSTOLIC BLOOD PRESSURE: 110 MMHG | OXYGEN SATURATION: 98 % | BODY MASS INDEX: 24.66 KG/M2 | HEIGHT: 65 IN | WEIGHT: 148 LBS | TEMPERATURE: 98 F | HEART RATE: 66 BPM | DIASTOLIC BLOOD PRESSURE: 74 MMHG

## 2018-08-17 PROCEDURE — 25000132 ZZH RX MED GY IP 250 OP 250 PS 637: Performed by: OBSTETRICS & GYNECOLOGY

## 2018-08-17 RX ORDER — OXYCODONE HYDROCHLORIDE 5 MG/1
5 TABLET ORAL EVERY 4 HOURS PRN
Qty: 12 TABLET | Refills: 0 | Status: SHIPPED | OUTPATIENT
Start: 2018-08-17

## 2018-08-17 RX ADMIN — SENNOSIDES AND DOCUSATE SODIUM 1 TABLET: 8.6; 5 TABLET ORAL at 12:00

## 2018-08-17 RX ADMIN — IBUPROFEN 800 MG: 400 TABLET ORAL at 04:16

## 2018-08-17 RX ADMIN — IBUPROFEN 800 MG: 400 TABLET ORAL at 12:00

## 2018-08-17 RX ADMIN — ACETAMINOPHEN 975 MG: 325 TABLET, FILM COATED ORAL at 06:52

## 2018-08-17 RX ADMIN — ACETAMINOPHEN 650 MG: 325 TABLET, FILM COATED ORAL at 12:01

## 2018-08-17 NOTE — PLAN OF CARE
Problem: Patient Care Overview  Goal: Plan of Care/Patient Progress Review  Outcome: Adequate for Discharge Date Met: 08/17/18  D: VSS, assessments WDL.   I: Pt. received complete discharge paperwork and home medications as filled by discharge pharmacy.  Pt. was given times of last dose for all discharge medications in writing on discharge medication sheets.  Discharge teaching included home medication, pain management, activity restrictions, postpartum cares, and signs and symptoms of infection.    A: Discharge outcomes on care plan met.  Mother states understanding and comfort with self and baby cares.  P: Pt. discharged to home.  Pt. was discharged with baby, and bands were checked at time of discharge.  Pt. was accompanied by , nurse and baby, and left with personal belongings.  Pt. to follow up with OB per MD order.  Pt. had no further questions at the time of discharge and no unmet needs were identified.

## 2018-08-17 NOTE — PLAN OF CARE
Problem: Patient Care Overview  Goal: Plan of Care/Patient Progress Review  Outcome: No Change  VSS. Fundus firm at midline. Incision C/D/I. Pain well controlled with Tylenol and Ibuprofen. Working on breastfeeding  and  cares. Pt feels like her milk is in and started pumping. Continue to monitor and notify MD as needed.

## 2018-08-17 NOTE — PLAN OF CARE
Problem: Patient Care Overview  Goal: Plan of Care/Patient Progress Review  Outcome: Improving  VSS.  Incision C/D/I.  Up ad ashley, tolerates well.  Pain well controlled, requesting prn pain meds as needed.  Wearing abdominal binder.  Working on breastfeeding  and  cares.  Breast engorged, milks in.  Pumping PRN to help relieve pressure.  Nipples cracked- using hydrogels and nipple shells.  Also has lanolin. Plan to discharge today.  On pathway. Continue to monitor and notify MD as needed.

## 2018-09-11 NOTE — DISCHARGE SUMMARY
Erin is a 36 yo  who underwent a repeat LTCS on 18.  There were no complications during the surgery. Erin did well and was discharged on POD 3. By that day she was ambulating, tolerating a regular diet, voiding without difficulty and passing gas. She was given precautions and pain medication. She will return to the clinic in 2 and 6 weeks.

## (undated) DEVICE — BLADE CLIPPER 4406

## (undated) DEVICE — SU PDS II 0 CT 36" Z358T

## (undated) DEVICE — PREP CHLORAPREP 26ML TINTED ORANGE  260815

## (undated) DEVICE — SOL NACL 0.9% IRRIG 1000ML BOTTLE 07138-09

## (undated) DEVICE — CATH TRAY FOLEY 16FR BARDEX W/DRAIN BAG STATLOCK 300316A

## (undated) DEVICE — PACK C-SECTION LF PL15OTA83B

## (undated) DEVICE — ESU GROUND PAD UNIVERSAL W/O CORD

## (undated) DEVICE — SU MONOCRYL 4-0 PS-2 18" UND Y496G

## (undated) DEVICE — SU VICRYL 3-0 CT-1 36" J338H

## (undated) DEVICE — SU MONOCRYL 0 CTX 36" Y398H

## (undated) DEVICE — LINEN C-SECTION 5415

## (undated) DEVICE — SUCTION CANISTER MEDIVAC LINER 3000ML W/LID 65651-530

## (undated) DEVICE — GLOVE PROTEXIS W/NEU-THERA 6.5  2D73TE65